# Patient Record
Sex: MALE | Race: WHITE | Employment: FULL TIME | URBAN - METROPOLITAN AREA
[De-identification: names, ages, dates, MRNs, and addresses within clinical notes are randomized per-mention and may not be internally consistent; named-entity substitution may affect disease eponyms.]

---

## 2019-09-03 ENCOUNTER — HOSPITAL ENCOUNTER (EMERGENCY)
Age: 39
Discharge: HOME | End: 2019-09-03
Payer: COMMERCIAL

## 2019-09-03 VITALS
OXYGEN SATURATION: 97 % | SYSTOLIC BLOOD PRESSURE: 130 MMHG | WEIGHT: 210 LBS | DIASTOLIC BLOOD PRESSURE: 87 MMHG | BODY MASS INDEX: 27.71 KG/M2 | HEART RATE: 78 BPM | RESPIRATION RATE: 14 BRPM | TEMPERATURE: 98.6 F

## 2019-09-03 DIAGNOSIS — H00.014 HORDEOLUM EXTERNUM OF LEFT UPPER EYELID: Primary | ICD-10-CM

## 2019-09-03 PROCEDURE — 99212 OFFICE O/P EST SF 10 MIN: CPT

## 2019-09-03 RX ORDER — ERYTHROMYCIN 5 MG/G
OINTMENT OPHTHALMIC
Qty: 1 TUBE | Refills: 0 | Status: SHIPPED | OUTPATIENT
Start: 2019-09-03 | End: 2019-09-13

## 2019-09-03 ASSESSMENT — PAIN SCALES - GENERAL: PAINLEVEL_OUTOF10: 8

## 2019-09-03 ASSESSMENT — PAIN DESCRIPTION - LOCATION: LOCATION: EYE

## 2019-09-03 ASSESSMENT — PAIN DESCRIPTION - ORIENTATION: ORIENTATION: LEFT

## 2019-09-03 NOTE — ED PROVIDER NOTES
Department of Emergency Medicine  ED Provider Note  Admit Date: 9/3/2019  Room: 04/04            HPI:  9/3/19, Time: 9:34 AM  .       Richard Feldman is a 37 y.o. old male presenting to the emergency department with pain and swelling to the left upper eyelid that started 2 days ago. He said it is tender he said it does not affect his eye itself the vision is unchanged is just painful on the eyelid area. Review of Systems:   Pertinent positives and negatives are stated within HPI, all other systems reviewed and are negative.          --------------------------------------------- PAST HISTORY ---------------------------------------------  Past Medical History:  has no past medical history on file. Past Surgical History:  has no past surgical history on file. Social History:  reports that he has been smoking cigarettes. He has a 15.00 pack-year smoking history. He does not have any smokeless tobacco history on file. Family History: family history is not on file. The patients home medications have been reviewed. Allergies: Patient has no known allergies. ---------------------------------------------------PHYSICAL EXAM--------------------------------------    Constitutional/General: Alert and oriented x3, well appearing, non toxic in NAD  Head: Normocephalic and atraumatic. No periorbital erythema or warmth or swelling  Eyes: PERRL, 3 mm, conjunctiva clear,  no evidence of hyphema, no evidence of entrapment. . No pain with EOM, and EOMI. Direct and consensual light reflex normal.  Left upper eyelid is swollen he has a reddened stye on the inner aspect of the upper eyelid no drainage, the conjunctiva is clear  Mouth: handling secretions, no trismus  Neck: Supple, full ROM  Pulmonary: Lungs clear to auscultation bilaterally. Not in respiratory distress  Cardiovascular:  Regular rate. Regular rhythm  Musculoskeletal: Moves all extremities x 4. Warm and well perfused  Skin: warm and dry.